# Patient Record
(demographics unavailable — no encounter records)

---

## 2025-01-16 NOTE — HISTORY OF PRESENT ILLNESS
[de-identified] : Ms. MARJAN MARX is a 72 year old woman here today for a follow-up visit   She is s/p right breast lumpectomy with KARL  localization and right axillary sentinel lymph node biopsy 20. Final pathology revealed: two lymph nodes negative for carcinoma; right breast lumpectomy- mucinous carcinoma, moderately differentiated, proliferative fibrocystic change including columnar change, apocrine metaplasia, and usual ductal hyperplasia ( all margins are not involved by carcinoma). ER+, DC+, HER2 negative.  Tumor stage: T1cN0.    Completed adjuvant RT on 2021, under the care of Dr. Akers.  Oncotype Dx= 17 Patient on Arimidex 1 mg daily   Labs 2023: WNL  [facility]   Labs 2024: WNL   B/L mammo/sono 24: No evidence of malignancy. BIRADS 2    Denies palpable breast masses, nipple discharge, skin dimpling, inversion. Has c/o chronic right breast soreness.     No family history of breast or ovarian cancer Menarche age: 10 y/o LMP:  (s/p hysterectomy for fibroids in her 40's)   (age at first pregnancy -18 y/o) PMH notable for HTN, HLD, Borderline DM, Abnormal EKG's requiring 2 cardiac cath's (no blockages/no stents), cholecystectomy, Hysterectomy @ age 40 for uterine fibroids.  No blood thinners.  Family history includes father with lung cancer in his 50's (heavy smoker); sister with a benign brain tumor @ age 56 (doing well) ; 2 children; 6 grandchildren; never a smoker; no alcohol use

## 2025-01-16 NOTE — HISTORY OF PRESENT ILLNESS
[de-identified] : Ms. MARJAN MARX is a 72 year old woman here today for a follow-up visit   She is s/p right breast lumpectomy with KARL  localization and right axillary sentinel lymph node biopsy 20. Final pathology revealed: two lymph nodes negative for carcinoma; right breast lumpectomy- mucinous carcinoma, moderately differentiated, proliferative fibrocystic change including columnar change, apocrine metaplasia, and usual ductal hyperplasia ( all margins are not involved by carcinoma). ER+, VA+, HER2 negative.  Tumor stage: T1cN0.    Completed adjuvant RT on 2021, under the care of Dr. Akers.  Oncotype Dx= 17 Patient on Arimidex 1 mg daily   Labs 2023: WNL  s/p right knee replacement 2023 ([facility])   Labs 2024: WNL   B/L mammo/sono 24: No evidence of malignancy. BIRADS 2    Denies palpable breast masses, nipple discharge, skin dimpling, inversion. Has c/o chronic right breast soreness.     No family history of breast or ovarian cancer Menarche age: 10 y/o LMP:  (s/p hysterectomy for fibroids in her 40's)   (age at first pregnancy -18 y/o) PMH notable for HTN, HLD, Borderline DM, Abnormal EKG's requiring 2 cardiac cath's (no blockages/no stents), cholecystectomy, Hysterectomy @ age 40 for uterine fibroids.  No blood thinners.  Family history includes father with lung cancer in his 50's (heavy smoker); sister with a benign brain tumor @ age 56 (doing well) ; 2 children; 6 grandchildren; never a smoker; no alcohol use

## 2025-01-16 NOTE — ASSESSMENT
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 ER/NC(+) HER2(-) Oncotype Dx =17 Completed RT (1/21/2021) MARINA on Arimidex 1 mg daily Labs 5/2023: WNL s/p right knee replacement 12/2023 ([facility])  Labs 6/2024: WNL B/L mammo/sono 8/2024- BIRADS 2    PLAN: B/L mammo/sono 8/2025  RTO after imaging and BW 8/2025

## 2025-01-16 NOTE — CONSULT LETTER
[Dear  ___] : Dear  [unfilled], [Courtesy Letter:] : I had the pleasure of seeing your patient, [unfilled], in my office today. [Please see my note below.] : Please see my note below. [Consult Closing:] : Thank you very much for allowing me to participate in the care of this patient.  If you have any questions, please do not hesitate to contact me. [Sincerely,] : Sincerely, [FreeTextEntry1] : I will keep you informed of my follow-up. [FreeTextEntry3] : Mike Son MD FACS Chief of Surgical Oncology

## 2025-01-16 NOTE — ASSESSMENT
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 ER/NH(+) HER2(-) Oncotype Dx =17 Completed RT (1/21/2021) MARINA on Arimidex 1 mg daily Labs 5/2023: WNL s/p right knee replacement 12/2023 ([facility])  Labs 6/2024: WNL B/L mammo/sono 8/2024- BIRADS 2    PLAN: B/L mammo/sono 8/2025  RTO after imaging and BW 8/2025

## 2025-01-16 NOTE — PHYSICAL EXAM
[Normal] : supple, no neck mass and thyroid not enlarged [Normal Supraclavicular Lymph Nodes] : normal supraclavicular lymph nodes [Normal Axillary Lymph Nodes] : normal axillary lymph nodes [Normal] : oriented to person, place and time, with appropriate affect [de-identified] : no masses or discharge

## 2025-01-16 NOTE — PHYSICAL EXAM
[Normal] : supple, no neck mass and thyroid not enlarged [Normal Supraclavicular Lymph Nodes] : normal supraclavicular lymph nodes [Normal Axillary Lymph Nodes] : normal axillary lymph nodes [Normal] : oriented to person, place and time, with appropriate affect [de-identified] : no masses or discharge